# Patient Record
Sex: FEMALE | Race: WHITE | Employment: FULL TIME | URBAN - METROPOLITAN AREA
[De-identification: names, ages, dates, MRNs, and addresses within clinical notes are randomized per-mention and may not be internally consistent; named-entity substitution may affect disease eponyms.]

---

## 2020-02-11 PROCEDURE — 88305 TISSUE EXAM BY PATHOLOGIST: CPT

## 2020-02-12 ENCOUNTER — HOSPITAL ENCOUNTER (OUTPATIENT)
Dept: LAB | Age: 50
Discharge: HOME | End: 2020-02-12

## 2020-03-04 RX ORDER — LIDOCAINE HYDROCHLORIDE 20 MG/ML
15 SOLUTION OROPHARYNGEAL AS NEEDED
Status: CANCELLED | OUTPATIENT
Start: 2020-03-04

## 2020-06-01 RX ORDER — LIDOCAINE HYDROCHLORIDE 20 MG/ML
15 SOLUTION OROPHARYNGEAL AS NEEDED
Status: CANCELLED | OUTPATIENT
Start: 2020-06-01

## 2020-06-22 NOTE — PROGRESS NOTES
Pt rescheduled for 8/20 due to no availability to use Manometry room on Tuesdays. Pt confirmed to arrive at 1230 for 1300 appointment.

## 2020-07-17 NOTE — PROGRESS NOTES
Pt called at this time and reminded of scheduled appointment. Pt also notified of screening procedures upon entrance to building as well as the need to arrive 30 min before the procedure. Pt verbalized understanding.

## 2020-07-20 ENCOUNTER — HOSPITAL ENCOUNTER (OUTPATIENT)
Age: 50
Setting detail: OUTPATIENT SURGERY
Discharge: HOME | End: 2020-07-20
Attending: INTERNAL MEDICINE | Admitting: INTERNAL MEDICINE
Payer: COMMERCIAL

## 2020-07-20 PROCEDURE — 74011000250 HC RX REV CODE- 250: Performed by: INTERNAL MEDICINE

## 2020-07-20 PROCEDURE — 91010 ESOPHAGUS MOTILITY STUDY: CPT | Performed by: INTERNAL MEDICINE

## 2020-07-20 RX ORDER — CALC/MAG/B COMPLEX/D3/HERB 61
15 TABLET ORAL
COMMUNITY

## 2020-07-20 RX ORDER — MAG HYDROX/ALUMINUM HYD/SIMETH 200-200-20
30 SUSPENSION, ORAL (FINAL DOSE FORM) ORAL
COMMUNITY

## 2020-07-20 RX ORDER — LIDOCAINE HYDROCHLORIDE 20 MG/ML
15 SOLUTION OROPHARYNGEAL AS NEEDED
Status: DISCONTINUED | OUTPATIENT
Start: 2020-07-20 | End: 2020-07-20 | Stop reason: HOSPADM

## 2020-07-20 RX ADMIN — LIDOCAINE HYDROCHLORIDE 15 ML: 20 SOLUTION ORAL; TOPICAL at 13:00

## 2020-07-22 NOTE — PROCEDURES
[facility]  PROCEDURE NOTE    Name:  Huy Alonzo  MR#:  [id number]  :  1970  ACCOUNT #:  [de-identified]  DATE OF SERVICE:  2020    PREOPERATIVE DIAGNOSIS:  Abdominal pain. POSTOPERATIVE DIAGNOSES:  1. Hypotensive lower esophageal sphincter with pressure only 9 mmHg. 2.  Hiatal hernia - 5.4 cm.  3.  Grossly abnormal motility with 50% of swallows weak or failed. 4.  Impedance data was 60% of liquid and 60% of viscous swallows revealing an exit. PROCEDURE PERFORMED:  High-resolution impedance manometry. PROCEDURE:  After obtaining informed consent, the patient underwent nasal intubation with the manometric probe. The lower esophageal sphincter was between 38.2 and 40.4 cm. The the patient did display a hiatal hernia approximately 5.4 cm. The resting pressure at the LES mid respiration averaged 9 and the IRP 6 mmHg. This indicates somewhat hypotensive contractility. This is perhaps fortunate because 50% of the swallows recorded were either weak or failed outright. The average DCI was only 727 mmHg but it was dragged upward by three vigorous swallows. The remainder were quite weak. The impedance data revealed 60% of liquid and 60% of viscous swallows with a complete transit. DISPOSITION:  Further followup per Dr. Julianna Henao.       Marlene Sigala MD      [id number]  D:  2020 14:25  T:  2020 16:19  JOB #:  [id number]  CC:  Leni Friedman MD

## 2020-09-29 PROBLEM — K44.9 LARGE HIATAL HERNIA: Status: ACTIVE | Noted: 2020-09-29

## 2020-10-30 RX ORDER — SODIUM CHLORIDE 0.9 % (FLUSH) 0.9 %
5-40 SYRINGE (ML) INJECTION AS NEEDED
Status: CANCELLED | OUTPATIENT
Start: 2020-10-30

## 2020-10-30 RX ORDER — SODIUM CHLORIDE 0.9 % (FLUSH) 0.9 %
5-40 SYRINGE (ML) INJECTION EVERY 8 HOURS
Status: CANCELLED | OUTPATIENT
Start: 2020-10-30

## 2021-01-20 ENCOUNTER — HOSPITAL ENCOUNTER (OUTPATIENT)
Dept: LAB | Age: 51
Discharge: HOME | End: 2021-01-20
Payer: COMMERCIAL

## 2021-01-20 LAB
ANION GAP SERPL CALC-SCNC: 4 MMOL/L (ref 7–16)
BUN SERPL-MCNC: 11 MG/DL (ref 6–23)
CALCIUM SERPL-MCNC: 10 MG/DL (ref 8.3–10.4)
CHLORIDE SERPL-SCNC: 108 MMOL/L (ref 98–107)
CO2 SERPL-SCNC: 28 MMOL/L (ref 21–32)
CREAT SERPL-MCNC: 0.7 MG/DL (ref 0.6–1)
ERYTHROCYTE [DISTWIDTH] IN BLOOD BY AUTOMATED COUNT: 14.8 % (ref 11.9–14.6)
GLUCOSE SERPL-MCNC: 73 MG/DL (ref 65–100)
HCT VFR BLD AUTO: 32 % (ref 35.8–46.3)
HGB BLD-MCNC: 9.9 G/DL (ref 11.7–15.4)
MCH RBC QN AUTO: 26 PG (ref 26.1–32.9)
MCHC RBC AUTO-ENTMCNC: 30.9 G/DL (ref 31.4–35)
MCV RBC AUTO: 84 FL (ref 79.6–97.8)
NRBC # BLD: 0 K/UL (ref 0–0.2)
PLATELET # BLD AUTO: 447 K/UL (ref 150–450)
PMV BLD AUTO: 9.6 FL (ref 9.4–12.3)
POTASSIUM SERPL-SCNC: 4.4 MMOL/L (ref 3.5–5.1)
RBC # BLD AUTO: 3.81 M/UL (ref 4.05–5.2)
SODIUM SERPL-SCNC: 140 MMOL/L (ref 136–145)
WBC # BLD AUTO: 8 K/UL (ref 4.3–11.1)

## 2021-01-20 PROCEDURE — 80048 BASIC METABOLIC PNL TOTAL CA: CPT

## 2021-01-20 PROCEDURE — 36415 COLL VENOUS BLD VENIPUNCTURE: CPT

## 2021-01-20 PROCEDURE — 85027 COMPLETE CBC AUTOMATED: CPT

## 2021-01-21 ENCOUNTER — ANESTHESIA EVENT (OUTPATIENT)
Dept: SURGERY | Age: 51
End: 2021-01-21
Payer: COMMERCIAL

## 2021-01-22 ENCOUNTER — ANESTHESIA (OUTPATIENT)
Dept: SURGERY | Age: 51
End: 2021-01-22
Payer: COMMERCIAL

## 2021-01-22 ENCOUNTER — HOSPITAL ENCOUNTER (OUTPATIENT)
Age: 51
Setting detail: OUTPATIENT SURGERY
Discharge: HOME | End: 2021-01-22
Attending: SURGERY | Admitting: SURGERY
Payer: COMMERCIAL

## 2021-01-22 VITALS
HEIGHT: 64 IN | RESPIRATION RATE: 12 BRPM | TEMPERATURE: 98 F | HEART RATE: 78 BPM | WEIGHT: 197.25 LBS | SYSTOLIC BLOOD PRESSURE: 106 MMHG | DIASTOLIC BLOOD PRESSURE: 62 MMHG | BODY MASS INDEX: 33.67 KG/M2 | OXYGEN SATURATION: 97 %

## 2021-01-22 DIAGNOSIS — K44.9 LARGE HIATAL HERNIA: Primary | ICD-10-CM

## 2021-01-22 LAB
ABO + RH BLD: NORMAL
BLOOD GROUP ANTIBODIES SERPL: NORMAL
HCG UR QL: NEGATIVE
SPECIMEN EXP DATE BLD: NORMAL

## 2021-01-22 PROCEDURE — 76060000034 HC ANESTHESIA 1.5 TO 2 HR: Performed by: SURGERY

## 2021-01-22 PROCEDURE — 77030020703 HC SEAL CANN DISP INTU -B: Performed by: SURGERY

## 2021-01-22 PROCEDURE — 77030008756 HC TU IRR SUC STRY -B: Performed by: SURGERY

## 2021-01-22 PROCEDURE — 77030019908 HC STETH ESOPH SIMS -A: Performed by: ANESTHESIOLOGY

## 2021-01-22 PROCEDURE — 77030040361 HC SLV COMPR DVT MDII -B: Performed by: SURGERY

## 2021-01-22 PROCEDURE — 76210000021 HC REC RM PH II 0.5 TO 1 HR: Performed by: SURGERY

## 2021-01-22 PROCEDURE — 77030039425 HC BLD LARYNG TRULITE DISP TELE -A: Performed by: ANESTHESIOLOGY

## 2021-01-22 PROCEDURE — 77030037088 HC TUBE ENDOTRACH ORAL NSL COVD-A: Performed by: ANESTHESIOLOGY

## 2021-01-22 PROCEDURE — 77030012406 HC DRN WND PENRS BARD -A: Performed by: SURGERY

## 2021-01-22 PROCEDURE — 77030008606 HC TRCR ENDOSC KII AMR -B: Performed by: SURGERY

## 2021-01-22 PROCEDURE — 2709999900 HC NON-CHARGEABLE SUPPLY: Performed by: SURGERY

## 2021-01-22 PROCEDURE — 77030016151 HC PROTCTR LNS DFOG COVD -B: Performed by: SURGERY

## 2021-01-22 PROCEDURE — 74011000250 HC RX REV CODE- 250: Performed by: SURGERY

## 2021-01-22 PROCEDURE — 74011250636 HC RX REV CODE- 250/636: Performed by: NURSE ANESTHETIST, CERTIFIED REGISTERED

## 2021-01-22 PROCEDURE — 81025 URINE PREGNANCY TEST: CPT

## 2021-01-22 PROCEDURE — 74011250636 HC RX REV CODE- 250/636: Performed by: ANESTHESIOLOGY

## 2021-01-22 PROCEDURE — 77030035277 HC OBTRTR BLDELSS DISP INTU -B: Performed by: SURGERY

## 2021-01-22 PROCEDURE — 77030008462 HC STPLR SKN PROX J&J -A: Performed by: SURGERY

## 2021-01-22 PROCEDURE — 77030040922 HC BLNKT HYPOTHRM STRY -A: Performed by: ANESTHESIOLOGY

## 2021-01-22 PROCEDURE — 77030020829: Performed by: SURGERY

## 2021-01-22 PROCEDURE — 74011250637 HC RX REV CODE- 250/637: Performed by: ANESTHESIOLOGY

## 2021-01-22 PROCEDURE — 74011250636 HC RX REV CODE- 250/636: Performed by: SURGERY

## 2021-01-22 PROCEDURE — 77030008522 HC TBNG INSUF LAPRO STRY -B: Performed by: SURGERY

## 2021-01-22 PROCEDURE — 74011000250 HC RX REV CODE- 250: Performed by: NURSE ANESTHETIST, CERTIFIED REGISTERED

## 2021-01-22 PROCEDURE — 43281 LAP PARAESOPHAG HERN REPAIR: CPT | Performed by: SURGERY

## 2021-01-22 PROCEDURE — 77030002912 HC SUT ETHBND J&J -A: Performed by: SURGERY

## 2021-01-22 PROCEDURE — 86901 BLOOD TYPING SEROLOGIC RH(D): CPT

## 2021-01-22 PROCEDURE — 76010000875 HC OR TIME 1.5 TO 2HR INTENSV - TIER 2: Performed by: SURGERY

## 2021-01-22 PROCEDURE — 76210000006 HC OR PH I REC 0.5 TO 1 HR: Performed by: SURGERY

## 2021-01-22 RX ORDER — SODIUM CHLORIDE, SODIUM LACTATE, POTASSIUM CHLORIDE, CALCIUM CHLORIDE 600; 310; 30; 20 MG/100ML; MG/100ML; MG/100ML; MG/100ML
100 INJECTION, SOLUTION INTRAVENOUS CONTINUOUS
Status: DISCONTINUED | OUTPATIENT
Start: 2021-01-22 | End: 2021-01-22 | Stop reason: HOSPADM

## 2021-01-22 RX ORDER — OXYCODONE AND ACETAMINOPHEN 7.5; 325 MG/1; MG/1
1 TABLET ORAL
Qty: 25 TAB | Refills: 0 | Status: SHIPPED | OUTPATIENT
Start: 2021-01-22 | End: 2021-01-27

## 2021-01-22 RX ORDER — DIPHENHYDRAMINE HYDROCHLORIDE 50 MG/ML
12.5 INJECTION, SOLUTION INTRAMUSCULAR; INTRAVENOUS
Status: DISCONTINUED | OUTPATIENT
Start: 2021-01-22 | End: 2021-01-22 | Stop reason: HOSPADM

## 2021-01-22 RX ORDER — DEXAMETHASONE SODIUM PHOSPHATE 4 MG/ML
INJECTION, SOLUTION INTRA-ARTICULAR; INTRALESIONAL; INTRAMUSCULAR; INTRAVENOUS; SOFT TISSUE AS NEEDED
Status: DISCONTINUED | OUTPATIENT
Start: 2021-01-22 | End: 2021-01-22 | Stop reason: HOSPADM

## 2021-01-22 RX ORDER — MIDAZOLAM HYDROCHLORIDE 1 MG/ML
2 INJECTION, SOLUTION INTRAMUSCULAR; INTRAVENOUS ONCE
Status: COMPLETED | OUTPATIENT
Start: 2021-01-22 | End: 2021-01-22

## 2021-01-22 RX ORDER — SODIUM CHLORIDE, SODIUM LACTATE, POTASSIUM CHLORIDE, CALCIUM CHLORIDE 600; 310; 30; 20 MG/100ML; MG/100ML; MG/100ML; MG/100ML
75 INJECTION, SOLUTION INTRAVENOUS CONTINUOUS
Status: DISCONTINUED | OUTPATIENT
Start: 2021-01-22 | End: 2021-01-22 | Stop reason: HOSPADM

## 2021-01-22 RX ORDER — APREPITANT 40 MG/1
80 CAPSULE ORAL ONCE
Status: DISCONTINUED | OUTPATIENT
Start: 2021-01-22 | End: 2021-01-22 | Stop reason: HOSPADM

## 2021-01-22 RX ORDER — PROPOFOL 10 MG/ML
INJECTION, EMULSION INTRAVENOUS AS NEEDED
Status: DISCONTINUED | OUTPATIENT
Start: 2021-01-22 | End: 2021-01-22 | Stop reason: HOSPADM

## 2021-01-22 RX ORDER — HYDROMORPHONE HYDROCHLORIDE 2 MG/ML
0.5 INJECTION, SOLUTION INTRAMUSCULAR; INTRAVENOUS; SUBCUTANEOUS
Status: DISCONTINUED | OUTPATIENT
Start: 2021-01-22 | End: 2021-01-22 | Stop reason: HOSPADM

## 2021-01-22 RX ORDER — FENTANYL CITRATE 50 UG/ML
INJECTION, SOLUTION INTRAMUSCULAR; INTRAVENOUS AS NEEDED
Status: DISCONTINUED | OUTPATIENT
Start: 2021-01-22 | End: 2021-01-22 | Stop reason: HOSPADM

## 2021-01-22 RX ORDER — ACETAMINOPHEN 500 MG
1000 TABLET ORAL ONCE
Status: COMPLETED | OUTPATIENT
Start: 2021-01-22 | End: 2021-01-22

## 2021-01-22 RX ORDER — FLUMAZENIL 0.1 MG/ML
0.2 INJECTION INTRAVENOUS
Status: DISCONTINUED | OUTPATIENT
Start: 2021-01-22 | End: 2021-01-22 | Stop reason: HOSPADM

## 2021-01-22 RX ORDER — BUPIVACAINE HYDROCHLORIDE 5 MG/ML
INJECTION, SOLUTION EPIDURAL; INTRACAUDAL AS NEEDED
Status: DISCONTINUED | OUTPATIENT
Start: 2021-01-22 | End: 2021-01-22 | Stop reason: HOSPADM

## 2021-01-22 RX ORDER — LIDOCAINE HYDROCHLORIDE 20 MG/ML
INJECTION, SOLUTION EPIDURAL; INFILTRATION; INTRACAUDAL; PERINEURAL AS NEEDED
Status: DISCONTINUED | OUTPATIENT
Start: 2021-01-22 | End: 2021-01-22 | Stop reason: HOSPADM

## 2021-01-22 RX ORDER — EPHEDRINE SULFATE/0.9% NACL/PF 50 MG/5 ML
SYRINGE (ML) INTRAVENOUS AS NEEDED
Status: DISCONTINUED | OUTPATIENT
Start: 2021-01-22 | End: 2021-01-22 | Stop reason: HOSPADM

## 2021-01-22 RX ORDER — FENTANYL CITRATE 50 UG/ML
100 INJECTION, SOLUTION INTRAMUSCULAR; INTRAVENOUS AS NEEDED
Status: DISCONTINUED | OUTPATIENT
Start: 2021-01-22 | End: 2021-01-22 | Stop reason: HOSPADM

## 2021-01-22 RX ORDER — NEOSTIGMINE METHYLSULFATE 1 MG/ML
INJECTION, SOLUTION INTRAVENOUS AS NEEDED
Status: DISCONTINUED | OUTPATIENT
Start: 2021-01-22 | End: 2021-01-22 | Stop reason: HOSPADM

## 2021-01-22 RX ORDER — CEFAZOLIN SODIUM/WATER 2 G/20 ML
2 SYRINGE (ML) INTRAVENOUS
Status: COMPLETED | OUTPATIENT
Start: 2021-01-22 | End: 2021-01-22

## 2021-01-22 RX ORDER — KETOROLAC TROMETHAMINE 30 MG/ML
INJECTION, SOLUTION INTRAMUSCULAR; INTRAVENOUS AS NEEDED
Status: DISCONTINUED | OUTPATIENT
Start: 2021-01-22 | End: 2021-01-22 | Stop reason: HOSPADM

## 2021-01-22 RX ORDER — SUCCINYLCHOLINE CHLORIDE 20 MG/ML
INJECTION INTRAMUSCULAR; INTRAVENOUS AS NEEDED
Status: DISCONTINUED | OUTPATIENT
Start: 2021-01-22 | End: 2021-01-22 | Stop reason: HOSPADM

## 2021-01-22 RX ORDER — ROCURONIUM BROMIDE 10 MG/ML
INJECTION, SOLUTION INTRAVENOUS AS NEEDED
Status: DISCONTINUED | OUTPATIENT
Start: 2021-01-22 | End: 2021-01-22 | Stop reason: HOSPADM

## 2021-01-22 RX ORDER — APREPITANT 40 MG/1
40 CAPSULE ORAL ONCE
Status: COMPLETED | OUTPATIENT
Start: 2021-01-22 | End: 2021-01-22

## 2021-01-22 RX ORDER — NALOXONE HYDROCHLORIDE 0.4 MG/ML
0.1 INJECTION, SOLUTION INTRAMUSCULAR; INTRAVENOUS; SUBCUTANEOUS AS NEEDED
Status: DISCONTINUED | OUTPATIENT
Start: 2021-01-22 | End: 2021-01-22 | Stop reason: HOSPADM

## 2021-01-22 RX ORDER — ONDANSETRON 2 MG/ML
INJECTION INTRAMUSCULAR; INTRAVENOUS AS NEEDED
Status: DISCONTINUED | OUTPATIENT
Start: 2021-01-22 | End: 2021-01-22 | Stop reason: HOSPADM

## 2021-01-22 RX ORDER — OXYCODONE HYDROCHLORIDE 5 MG/1
5 TABLET ORAL
Status: DISCONTINUED | OUTPATIENT
Start: 2021-01-22 | End: 2021-01-22 | Stop reason: HOSPADM

## 2021-01-22 RX ORDER — GLYCOPYRROLATE 0.2 MG/ML
INJECTION INTRAMUSCULAR; INTRAVENOUS AS NEEDED
Status: DISCONTINUED | OUTPATIENT
Start: 2021-01-22 | End: 2021-01-22 | Stop reason: HOSPADM

## 2021-01-22 RX ORDER — LIDOCAINE HYDROCHLORIDE 10 MG/ML
0.1 INJECTION INFILTRATION; PERINEURAL AS NEEDED
Status: DISCONTINUED | OUTPATIENT
Start: 2021-01-22 | End: 2021-01-22 | Stop reason: HOSPADM

## 2021-01-22 RX ADMIN — PHENYLEPHRINE HYDROCHLORIDE 100 MCG: 10 INJECTION INTRAVENOUS at 08:42

## 2021-01-22 RX ADMIN — SODIUM CHLORIDE, SODIUM LACTATE, POTASSIUM CHLORIDE, AND CALCIUM CHLORIDE 100 ML/HR: 600; 310; 30; 20 INJECTION, SOLUTION INTRAVENOUS at 06:41

## 2021-01-22 RX ADMIN — Medication 10 MG: at 08:38

## 2021-01-22 RX ADMIN — ROCURONIUM BROMIDE 5 MG: 10 INJECTION, SOLUTION INTRAVENOUS at 09:00

## 2021-01-22 RX ADMIN — Medication 2 G: at 07:52

## 2021-01-22 RX ADMIN — PHENYLEPHRINE HYDROCHLORIDE 100 MCG: 10 INJECTION INTRAVENOUS at 07:50

## 2021-01-22 RX ADMIN — PHENYLEPHRINE HYDROCHLORIDE 150 MCG: 10 INJECTION INTRAVENOUS at 08:14

## 2021-01-22 RX ADMIN — SUCCINYLCHOLINE CHLORIDE 160 MG: 20 INJECTION, SOLUTION INTRAMUSCULAR; INTRAVENOUS at 07:37

## 2021-01-22 RX ADMIN — ROCURONIUM BROMIDE 10 MG: 10 INJECTION, SOLUTION INTRAVENOUS at 08:35

## 2021-01-22 RX ADMIN — Medication 4 MG: at 09:09

## 2021-01-22 RX ADMIN — PROPOFOL 200 MG: 10 INJECTION, EMULSION INTRAVENOUS at 07:37

## 2021-01-22 RX ADMIN — LIDOCAINE HYDROCHLORIDE 100 MG: 20 INJECTION, SOLUTION EPIDURAL; INFILTRATION; INTRACAUDAL; PERINEURAL at 07:37

## 2021-01-22 RX ADMIN — ONDANSETRON 4 MG: 2 INJECTION INTRAMUSCULAR; INTRAVENOUS at 09:06

## 2021-01-22 RX ADMIN — FENTANYL CITRATE 50 MCG: 50 INJECTION INTRAMUSCULAR; INTRAVENOUS at 08:41

## 2021-01-22 RX ADMIN — GLYCOPYRROLATE 0.6 MG: 0.2 INJECTION, SOLUTION INTRAMUSCULAR; INTRAVENOUS at 09:09

## 2021-01-22 RX ADMIN — ROCURONIUM BROMIDE 30 MG: 10 INJECTION, SOLUTION INTRAVENOUS at 07:50

## 2021-01-22 RX ADMIN — ROCURONIUM BROMIDE 10 MG: 10 INJECTION, SOLUTION INTRAVENOUS at 08:16

## 2021-01-22 RX ADMIN — PHENYLEPHRINE HYDROCHLORIDE 100 MCG: 10 INJECTION INTRAVENOUS at 08:26

## 2021-01-22 RX ADMIN — MIDAZOLAM 2 MG: 1 INJECTION INTRAMUSCULAR; INTRAVENOUS at 07:04

## 2021-01-22 RX ADMIN — APREPITANT 40 MG: 40 CAPSULE ORAL at 06:37

## 2021-01-22 RX ADMIN — FENTANYL CITRATE 50 MCG: 50 INJECTION INTRAMUSCULAR; INTRAVENOUS at 07:58

## 2021-01-22 RX ADMIN — FENTANYL CITRATE 100 MCG: 50 INJECTION INTRAMUSCULAR; INTRAVENOUS at 07:37

## 2021-01-22 RX ADMIN — KETOROLAC TROMETHAMINE 30 MG: 30 INJECTION, SOLUTION INTRAMUSCULAR at 09:20

## 2021-01-22 RX ADMIN — DEXAMETHASONE SODIUM PHOSPHATE 4 MG: 4 INJECTION, SOLUTION INTRAMUSCULAR; INTRAVENOUS at 07:54

## 2021-01-22 RX ADMIN — ACETAMINOPHEN 1000 MG: 500 TABLET ORAL at 06:37

## 2021-01-22 RX ADMIN — PHENYLEPHRINE HYDROCHLORIDE 150 MCG: 10 INJECTION INTRAVENOUS at 07:59

## 2021-01-22 RX ADMIN — SODIUM CHLORIDE, SODIUM LACTATE, POTASSIUM CHLORIDE, AND CALCIUM CHLORIDE: 600; 310; 30; 20 INJECTION, SOLUTION INTRAVENOUS at 08:30

## 2021-01-22 NOTE — OP NOTES
[address] [facility]  OPERATIVE REPORT    Name:  Vanna Chong  MR#:  [id number]  :  1970  ACCOUNT #:  [de-identified]  DATE OF SERVICE:  2021    PREOPERATIVE DIAGNOSIS:  Hiatal hernia with organoaxial volvulus. POSTOPERATIVE DIAGNOSIS:  Hiatal hernia with organoaxial volvulus. PROCEDURE PERFORMED:  Robotic hiatal hernia repair. SURGEON:  Karrie Shelton. Esperanza Montes MD    ASSISTANT:  None. ANESTHESIA:  General.    COMPLICATIONS:  None. SPECIMENS REMOVED:  None. IMPLANTS:  None. ESTIMATED BLOOD LOSS:  Minimal.    COUNT:  Correct. PROCEDURE:  After the patient was asleep, the abdomen was prepped and draped in sterile fashion. Time-out was carried out and all were in agreement. An incision was made approximately 4 cm above the umbilicus and 3 cm to the left secondary to the patient's body habitus. An incision was made and 5-mm OptiVu placed into the abdominal cavity without problem. Insufflation acquired. Scope introduced. The patient was placed in a foot down position. Under direct visualization, a right subcostal lateral 5-mm trocar was placed after infiltration of the skin, subcutaneous tissue and peritoneum with 0.5% Marcaine with epinephrine. Equidistance between the camera port and this assistance port, an 8-mm trocar was placed under direct visualization and with infiltration of Marcaine. Left lateral 5-mm assistance port was then placed under direct visualization and after infiltration of Marcaine and equidistance between the camera and the left lateral assistance port, an 8-mm trocar was placed. The 5-mm trocar was removed at the camera site and replaced with an 8-mm trocar port. At this time, the robot was brought in and docked. Liver retractor placed on the left lobe of the liver and this was retracted superiorly and medially out of the way. I then broke scrub and sat at the console. Stomach was grasped and brought down.   Dissection was begun on the left lou with the Harmonic scalpel. This was carried out around the large diaphragmatic opening. The majority of the stomach was in the chest.  The hernia sac was then dissected free completely and then excised with the Harmonic scalpel. This was then placed down inferiorly to the stomach for removal later. The esophagus was lying very posteriorly directly on top of the aorta. Therefore, the lou could not really be closed posteriorly, so I left this alone and the lou was closed anteriorly very successfully with interrupted 0 Ethibonds. Once this was accomplished, the procedure was terminated. No wrap was done secondary to the patient having dysmotility of the esophagus. The hernia sac was removed. All sutures were removed and needles. Inspection revealed no bleeding. At this time, the camera of the robot was undocked. Pneumoperitoneum released and staples was used to close the incisions. The patient tolerated the procedure well.         Enrique Cottrell MD      [id number]  D:  01/22/2021 9:43  T:  01/22/2021 10:35  JOB #:  [id number]

## 2021-01-22 NOTE — BRIEF OP NOTE
Brief Postoperative Note    Patient: Keysha Alejo  YOB: 1970  MRN: [id number]    Date of Procedure: 1/22/2021     Pre-Op Diagnosis: Hernia, hiatal [K44.9]    Post-Op Diagnosis: Same as preoperative diagnosis.       Procedure(s):  HIATAL HERNIA REPAIR ROBOTIC ASSISTED    Surgeon(s):  Smiley Love MD    Surgical Assistant: None    Anesthesia: General     Estimated Blood Loss (mL): Minimal    Complications: None    Specimens: * No specimens in log *     Implants: * No implants in log *    Drains: * No LDAs found *    Findings: see op note    Electronically Signed by Candido Mclaughlin MD on 1/22/2021 at 9:39 AM

## 2021-01-22 NOTE — H&P
HPI: Celine Casiano is a 53 y.o. female who here to see me today to discuss hiatal hernia repair. She has a large paraesophageal hernia with organoaxial volvulus. Dr. [facility] feels like she is at a high risk for emergency surgery. She does have pain when she swallows at times. In general she has no problems swallowing and is not losing any weight. When she does have trouble it is probably related to her esophageal dysmotility. Pain is in the left subcostal and left chest region.                Past Medical History:   Diagnosis Date    Thyroid disease        History reviewed. No pertinent surgical history. Current Outpatient Medications   Medication Sig    lansoprazole (Prevacid) 15 mg capsule Take 15 mg by mouth Daily (before breakfast).  alum-mag hydroxide-simeth (MYLANTA) 200-200-20 mg/5 mL susp Take 30 mL by mouth every four (4) hours as needed for Indigestion.      No current facility-administered medications for this visit.       ALLERGIES:  Latex; Pcn [penicillins]; Shellfish derived; and Sulfa (sulfonamide antibiotics)     Social History               Socioeconomic History    Marital status:        Spouse name: Not on file    Number of children: Not on file    Years of education: Not on file    Highest education level: Not on file   Tobacco Use    Smoking status: Never Smoker    Smokeless tobacco: Never Used         Social History          Tobacco Use   Smoking Status Never Smoker   Smokeless Tobacco Never Used      History reviewed. No pertinent family history.     ROS: The patient has no difficulty with chest pain or shortness of breath. No fever or chills. Comprehensive review of systems was otherwise unremarkable except as noted above.     Physical Exam:   Constitutional: Alert oriented cooperative patient in no acute distress.    Visit Vitals  /84   Pulse 89   Ht 5' 4\" (1.626 m)   Wt 187 lb (84.8 kg)   SpO2 98%   BMI 32.10 kg/m²      Eyes:Sclera are clear, pupils symmetric   ENMT: ears have no obvious external lesions; no obvious neck masses; no lip lesions  CV: RRR. Normal perfusion; no embolic signs  Resp: No JVD. Breathing is  non-labored. No audible wheezing   GI: soft and non-distended     Musculoskeletal: no significant asymmetry; normal tone; unremarkable with normal function. Neuro:  No obvious focal deficits; moves all 4; A&O x3  Psychiatric: normal affect and mood, no memory impairment        Labs:  No results found for: WBC, HGB, PLT, NA, K, CL, CO2, BUN, CREA, GLU, INR, APTT, TBIL, TBILI, CBIL, ALT, AP, AML, LPSE, LCAD, NH4, TROPT, TROIQ, HGBEXT, PLTEXT, INREXT     No results found for this or any previous visit. I reviewed patient's medications, labs, and independently reviewed relevant radiologic studies if available. Labs/radiology studies as ordered in connect care or in scanned in media tab if pt is pre-op. Amt of data reviewed moderate-extensive            Assessment/Plan:     )Villa Dakins is a 53 y.o. female who has signs and symptoms consistent with the below issues:             Problem List  Date Reviewed: 9/29/2020           Codes Class Noted     Large hiatal hernia ICD-10-CM: K44.9  ICD-9-CM: 553. 3   9/29/2020             I have spent 20 minutes with her this morning and greater than 50% of this time was spent in consultation. I have discussed robotic hiatal hernia repair. She cannot have a wrap secondary to esophageal dysmotility. She does have an organoaxial volvulus. I explained to her that this may very well help her feel better but recurrence is very high. I have gone over the risks to include possible open procedure, possible injury to spleen, esophagus or stomach as well as possible splenectomy and bleeding. All questions were answered. She would like to go forward. Proceed with robotic hiatal hernia repair without wrap. The patient was counseled at length about the risks of [place] Covid-19 during their perioperative period and any recovery window from their procedure.  The patient was made aware that [place] Covid-19  may worsen their prognosis for recovering from their procedure and lend to a higher morbidity and/or mortality risk.  All material risks, benefits, and reasonable alternatives including postponing the procedure were discussed.  The patient does  wish to proceed with the procedure at this time.                       Bruce Lebron MD,  FACS

## 2021-01-22 NOTE — ANESTHESIA PREPROCEDURE EVALUATION
Relevant Problems   No relevant active problems       Anesthetic History     PONV          Review of Systems / Medical History  Patient summary reviewed and pertinent labs reviewed    Pulmonary  Within defined limits                 Neuro/Psych   Within defined limits           Cardiovascular  Within defined limits                Exercise tolerance: >4 METS: Active and denied chest pain, SOB, syncope      GI/Hepatic/Renal     GERD: well controlled      Hiatal hernia     Endo/Other      Hypothyroidism  Obesity and anemia (Hb 9.9)     Other Findings              Physical Exam    Airway  Mallampati: I  TM Distance: 4 - 6 cm  Neck ROM: normal range of motion   Mouth opening: Normal     Cardiovascular    Rhythm: regular  Rate: normal      Pertinent negatives: No murmur   Dental  No notable dental hx       Pulmonary  Breath sounds clear to auscultation               Abdominal  GI exam deferred       Other Findings            Anesthetic Plan    ASA: 2  Anesthesia type: general  ETT        Induction: Intravenous and RSI  Anesthetic plan and risks discussed with: Patient and Spouse

## 2021-01-22 NOTE — ANESTHESIA POSTPROCEDURE EVALUATION
Procedure(s):  HIATAL HERNIA REPAIR ROBOTIC ASSISTED.     general    Anesthesia Post Evaluation      Multimodal analgesia: multimodal analgesia used between 6 hours prior to anesthesia start to PACU discharge  Patient location during evaluation: PACU  Patient participation: complete - patient participated  Level of consciousness: awake and alert  Pain management: adequate  Airway patency: patent  Anesthetic complications: no  Cardiovascular status: acceptable  Respiratory status: acceptable  Hydration status: acceptable  Post anesthesia nausea and vomiting:  controlled  Final Post Anesthesia Temperature Assessment:  Normothermia (36.0-37.5 degrees C)      INITIAL Post-op Vital signs:   Vitals Value Taken Time   /59 01/22/21 1001   Temp 36.7 °C (98 °F) 01/22/21 1001   Pulse 96 01/22/21 1001   Resp 17 01/22/21 1001   SpO2 98 % 01/22/21 1001

## (undated) DEVICE — COVER,TABLE,44X76,STERILE: Brand: MEDLINE

## (undated) DEVICE — CADIERE FORCEPS: Brand: ENDOWRIST

## (undated) DEVICE — DRAPE,LAP,CHOLE,W/TROUGHS,STERILE: Brand: MEDLINE

## (undated) DEVICE — DISPOSABLE GRASPER CARTRIDGE: Brand: DIRECT DRIVE REPOSABLE GRASPERS

## (undated) DEVICE — SOLUTION MNOMTR 80ML ES HI VISC FOR VISCOUS SWALLOW DONE

## (undated) DEVICE — FENESTRATED BIPOLAR FORCEPS: Brand: ENDOWRIST

## (undated) DEVICE — SEAL UNIV 5-8MM DISP BX/10 -- DA VINCI XI - SNGL USE

## (undated) DEVICE — DRAIN WND PENRS RADPQ 0.25X12 --

## (undated) DEVICE — APPLICATOR COT-TIP WOOD 6IN -- NON STRL

## (undated) DEVICE — VISUALIZATION SYSTEM: Brand: CLEARIFY

## (undated) DEVICE — ELECTRO LUBE IS A SINGLE PATIENT USE DEVICE THAT IS INTENDED TO BE USED ON ELECTROSURGICAL ELECTRODES TO REDUCE STICKING.: Brand: KEY SURGICAL ELECTRO LUBE

## (undated) DEVICE — GENERAL LAPAROSCOPY: Brand: MEDLINE INDUSTRIES, INC.

## (undated) DEVICE — TROCAR: Brand: KII® SLEEVE

## (undated) DEVICE — [HIGH FLOW INSUFFLATOR,  DO NOT USE IF PACKAGE IS DAMAGED,  KEEP DRY,  KEEP AWAY FROM SUNLIGHT,  PROTECT FROM HEAT AND RADIOACTIVE SOURCES.]: Brand: PNEUMOSURE

## (undated) DEVICE — COVER,LIGHT HANDLE,FLX,2/PK: Brand: MEDLINE INDUSTRIES, INC.

## (undated) DEVICE — SYR 3ML LL TIP 1/10ML GRAD --

## (undated) DEVICE — INTENDED FOR TISSUE SEPARATION, AND OTHER PROCEDURES THAT REQUIRE A SHARP SURGICAL BLADE TO PUNCTURE OR CUT.: Brand: BARD-PARKER SAFETY BLADES SIZE 15, STERILE

## (undated) DEVICE — SYRINGE CATH TIP 50ML

## (undated) DEVICE — BIPOLAR CAUTERY CORD

## (undated) DEVICE — BASIN EMESIS 500CC ROSE 250/CS 60/PLT: Brand: MEDEGEN MEDICAL PRODUCTS, LLC

## (undated) DEVICE — 2, DISPOSABLE SUCTION/IRRIGATOR WITHOUT DISPOSABLE TIP: Brand: STRYKEFLOW

## (undated) DEVICE — GARMENT,MEDLINE,DVT,INT,CALF,MED, GEN2: Brand: MEDLINE

## (undated) DEVICE — REM POLYHESIVE ADULT PATIENT RETURN ELECTRODE: Brand: VALLEYLAB

## (undated) DEVICE — TROCAR: Brand: KII FIOS FIRST ENTRY

## (undated) DEVICE — TISSUE FACE KLNX 9X8IN --

## (undated) DEVICE — MEGA SUTURECUT ND: Brand: ENDOWRIST

## (undated) DEVICE — BLADELESS OBTURATOR: Brand: WECK VISTA

## (undated) DEVICE — TOWEL,OR,DSP,ST,BLUE,DLX,1/PK,80PK/CS: Brand: MEDLINE

## (undated) DEVICE — CUP MED 1OZ CLR POLYPR FEED GRAD W/O LID

## (undated) DEVICE — SUT ETHBND 0 30IN SH GRN --

## (undated) DEVICE — COLUMN DRAPE

## (undated) DEVICE — 3M™ TRANSPORE™ WHITE SURGICAL TAPE 1534-1, 1 INCH X 10 YARD (2,5CM X 9,1M), 12 ROLLS/CARTON 10 CARTONS/CASE: Brand: 3M™ TRANSPORE™

## (undated) DEVICE — ARM DRAPE

## (undated) DEVICE — Device

## (undated) DEVICE — BASIC SINGLE BASIN 1-LF: Brand: MEDLINE INDUSTRIES, INC.

## (undated) DEVICE — NEEDLE HYPO 25GA L1.5IN BLU POLYPR HUB S STL REG BVL STR

## (undated) DEVICE — MONOPOLAR CAUTERY CORD